# Patient Record
Sex: FEMALE | URBAN - METROPOLITAN AREA
[De-identification: names, ages, dates, MRNs, and addresses within clinical notes are randomized per-mention and may not be internally consistent; named-entity substitution may affect disease eponyms.]

---

## 2022-02-18 ENCOUNTER — OFFICE VISIT (OUTPATIENT)
Dept: URBAN - METROPOLITAN AREA CLINIC 43 | Facility: CLINIC | Age: 67
End: 2022-02-18
Payer: MEDICARE

## 2022-02-18 DIAGNOSIS — H04.123 DRY EYE SYNDROME OF BILATERAL LACRIMAL GLANDS: Primary | ICD-10-CM

## 2022-02-18 DIAGNOSIS — H11.32 SUBCONJUNCTIVAL HEMORRHAGE OF LEFT EYE: ICD-10-CM

## 2022-02-18 PROCEDURE — 99203 OFFICE O/P NEW LOW 30 MIN: CPT | Performed by: OPTOMETRIST

## 2022-02-18 ASSESSMENT — INTRAOCULAR PRESSURE
OS: 20
OD: 18

## 2022-03-08 ENCOUNTER — OFFICE VISIT (OUTPATIENT)
Dept: URBAN - METROPOLITAN AREA CLINIC 43 | Facility: CLINIC | Age: 67
End: 2022-03-08
Payer: MEDICARE

## 2022-03-08 DIAGNOSIS — H25.13 AGE-RELATED NUCLEAR CATARACT, BILATERAL: Primary | ICD-10-CM

## 2022-03-08 DIAGNOSIS — H52.4 PRESBYOPIA: ICD-10-CM

## 2022-03-08 DIAGNOSIS — H16.223 KERATOCONJUNCT SICCA, NOT SPECIFIED AS SJOGREN'S, BILATERAL: ICD-10-CM

## 2022-03-08 PROCEDURE — 99214 OFFICE O/P EST MOD 30 MIN: CPT | Performed by: OPTOMETRIST

## 2022-03-08 ASSESSMENT — VISUAL ACUITY
OD: 20/20
OS: 20/20

## 2022-03-08 ASSESSMENT — INTRAOCULAR PRESSURE
OS: 15
OD: 15

## 2022-03-08 NOTE — IMPRESSION/PLAN
Impression: Keratoconjunct sicca, not specified as Sjogren's, bilateral: P01.871. Plan: Pt failed AT.  Longtime uses of Restasis  PA for restasis will be submitted

## 2023-03-21 ENCOUNTER — OFFICE VISIT (OUTPATIENT)
Dept: URBAN - METROPOLITAN AREA CLINIC 43 | Facility: CLINIC | Age: 68
End: 2023-03-21
Payer: MEDICARE

## 2023-03-21 DIAGNOSIS — H04.222 EPIPHORA DUE TO INSUFFICIENT DRAINAGE, LEFT EYE: ICD-10-CM

## 2023-03-21 DIAGNOSIS — H16.223 KERATOCONJUNCT SICCA, NOT SPECIFIED AS SJOGREN'S, BILATERAL: Primary | ICD-10-CM

## 2023-03-21 DIAGNOSIS — H25.13 AGE-RELATED NUCLEAR CATARACT, BILATERAL: ICD-10-CM

## 2023-03-21 PROCEDURE — 99214 OFFICE O/P EST MOD 30 MIN: CPT | Performed by: OPTOMETRIST

## 2023-03-21 RX ORDER — CYCLOSPORINE/CHONDROITIN SULFATE PF 1 MG/ML
EMULSION OPHTHALMIC
Qty: 5.5 | Refills: 11 | Status: ACTIVE
Start: 2023-03-21

## 2023-03-21 ASSESSMENT — INTRAOCULAR PRESSURE
OS: 18
OD: 16

## 2023-03-21 ASSESSMENT — VISUAL ACUITY
OD: 20/20
OS: 20/20

## 2023-03-21 NOTE — IMPRESSION/PLAN
Impression: Age-related nuclear cataract, bilateral: H25.13. Plan:  Discussed cataracts with patient. Discussed treatment options. Surgical treatment is recommended. Surgical risks and benefits discussed. Patient elects surgical treatment. Recommend surgery OU, OD first. standard lens, ORA, Aim OD: plano. Aim OS: plano.

## 2023-03-21 NOTE — IMPRESSION/PLAN
Impression: Keratoconjunct sicca, not specified as Sjogren's, bilateral: B46.630. Plan: Recommend artificial tears at least 4 times a day and gel drop or tear ointment at bedtime. Restasis too costly. Will switch to cyclosporine in [facility].

## 2023-06-09 ENCOUNTER — OFFICE VISIT (OUTPATIENT)
Dept: URBAN - METROPOLITAN AREA CLINIC 43 | Facility: CLINIC | Age: 68
End: 2023-06-09
Payer: MEDICARE

## 2023-06-09 DIAGNOSIS — H11.32 SUBCONJUNCTIVAL HEMORRHAGE OF LEFT EYE: ICD-10-CM

## 2023-06-09 DIAGNOSIS — H16.223 KERATOCONJUNCT SICCA, NOT SPECIFIED AS SJOGREN'S, BILATERAL: ICD-10-CM

## 2023-06-09 DIAGNOSIS — H25.13 AGE-RELATED NUCLEAR CATARACT, BILATERAL: Primary | ICD-10-CM

## 2023-06-09 PROCEDURE — 99214 OFFICE O/P EST MOD 30 MIN: CPT | Performed by: OPTOMETRIST

## 2023-06-09 ASSESSMENT — VISUAL ACUITY
OD: 20/20
OS: 20/20

## 2023-06-09 ASSESSMENT — KERATOMETRY
OS: 44.88
OD: 44.63

## 2023-06-09 ASSESSMENT — INTRAOCULAR PRESSURE
OD: 15
OS: 16

## 2023-06-09 NOTE — IMPRESSION/PLAN
Impression: Subconjunctival hemorrhage of left eye: H11.32. Plan: DWP self limiting condition. Use ATs.

## 2023-06-09 NOTE — IMPRESSION/PLAN
Impression: Keratoconjunct sicca, not specified as Sjogren's, bilateral: P51.860. Plan: Recommend artificial tears at least 4 times a day and gel drop or tear ointment at bedtime. Restasis too costly. Will switch to cyclosporine in [facility].

## 2023-08-14 ENCOUNTER — OFFICE VISIT (OUTPATIENT)
Dept: URBAN - METROPOLITAN AREA CLINIC 44 | Facility: CLINIC | Age: 68
End: 2023-08-14
Payer: MEDICARE

## 2023-08-14 DIAGNOSIS — H25.13 AGE-RELATED NUCLEAR CATARACT, BILATERAL: Primary | ICD-10-CM

## 2023-08-14 ASSESSMENT — PACHYMETRY
OD: 3.29
OS: 22.28
OS: 3.23
OD: 22.45

## 2023-09-13 ENCOUNTER — POST-OPERATIVE VISIT (OUTPATIENT)
Dept: URBAN - METROPOLITAN AREA CLINIC 43 | Facility: CLINIC | Age: 68
End: 2023-09-13

## 2023-09-13 DIAGNOSIS — Z48.810 ENCOUNTER FOR SURGICAL AFTERCARE FOLLOWING SURGERY ON A SENSE ORGAN: Primary | ICD-10-CM

## 2023-09-13 PROCEDURE — 99024 POSTOP FOLLOW-UP VISIT: CPT | Performed by: OPTOMETRIST

## 2023-09-13 ASSESSMENT — INTRAOCULAR PRESSURE
OS: 14
OD: 15

## 2023-09-20 ENCOUNTER — POST-OPERATIVE VISIT (OUTPATIENT)
Dept: URBAN - METROPOLITAN AREA CLINIC 43 | Facility: CLINIC | Age: 68
End: 2023-09-20

## 2023-09-20 DIAGNOSIS — Z48.810 ENCOUNTER FOR SURGICAL AFTERCARE FOLLOWING SURGERY ON A SENSE ORGAN: Primary | ICD-10-CM

## 2023-09-20 PROCEDURE — 99024 POSTOP FOLLOW-UP VISIT: CPT | Performed by: OPTOMETRIST

## 2023-09-20 ASSESSMENT — INTRAOCULAR PRESSURE
OD: 15
OS: 18

## 2023-09-27 ENCOUNTER — POST-OPERATIVE VISIT (OUTPATIENT)
Dept: URBAN - METROPOLITAN AREA CLINIC 43 | Facility: CLINIC | Age: 68
End: 2023-09-27

## 2023-09-27 DIAGNOSIS — Z96.1 PRESENCE OF INTRAOCULAR LENS: Primary | ICD-10-CM

## 2023-09-27 PROCEDURE — 99024 POSTOP FOLLOW-UP VISIT: CPT | Performed by: OPTOMETRIST

## 2023-09-27 ASSESSMENT — INTRAOCULAR PRESSURE: OS: 14

## 2023-10-31 ENCOUNTER — OFFICE VISIT (OUTPATIENT)
Dept: URBAN - METROPOLITAN AREA CLINIC 43 | Facility: CLINIC | Age: 68
End: 2023-10-31

## 2023-10-31 DIAGNOSIS — Z96.1 PRESENCE OF INTRAOCULAR LENS: ICD-10-CM

## 2023-10-31 DIAGNOSIS — H26.492 OTHER SECONDARY CATARACT, LEFT EYE: Primary | ICD-10-CM

## 2023-10-31 PROCEDURE — 99024 POSTOP FOLLOW-UP VISIT: CPT | Performed by: OPHTHALMOLOGY

## 2023-11-07 ENCOUNTER — SURGERY (OUTPATIENT)
Dept: URBAN - METROPOLITAN AREA SURGERY 15 | Facility: SURGERY | Age: 68
End: 2023-11-07
Payer: MEDICARE

## 2023-11-07 PROCEDURE — 66821 AFTER CATARACT LASER SURGERY: CPT | Performed by: OPHTHALMOLOGY

## 2024-01-11 ENCOUNTER — POST-OPERATIVE VISIT (OUTPATIENT)
Dept: URBAN - METROPOLITAN AREA CLINIC 43 | Facility: CLINIC | Age: 69
End: 2024-01-11

## 2024-01-11 DIAGNOSIS — Z96.1 PRESENCE OF INTRAOCULAR LENS: Primary | ICD-10-CM

## 2024-01-11 PROCEDURE — 99024 POSTOP FOLLOW-UP VISIT: CPT | Performed by: OPTOMETRIST

## 2024-01-11 ASSESSMENT — INTRAOCULAR PRESSURE
OS: 16
OD: 14

## 2024-01-11 ASSESSMENT — KERATOMETRY
OS: 45.13
OD: 44.38

## 2024-01-11 ASSESSMENT — VISUAL ACUITY
OD: 20/20
OS: 20/20

## 2024-01-26 ENCOUNTER — OFFICE VISIT (OUTPATIENT)
Dept: URBAN - METROPOLITAN AREA CLINIC 10 | Facility: CLINIC | Age: 69
End: 2024-01-26
Payer: MEDICARE

## 2024-01-26 DIAGNOSIS — H43.313 VITREOUS MEMBRANES AND STRANDS, BILATERAL: ICD-10-CM

## 2024-01-26 DIAGNOSIS — R51.9 HEADACHE, UNSPECIFIED: ICD-10-CM

## 2024-01-26 DIAGNOSIS — H04.123 DRY EYE SYNDROME OF BILATERAL LACRIMAL GLANDS: Primary | ICD-10-CM

## 2024-01-26 PROCEDURE — 99213 OFFICE O/P EST LOW 20 MIN: CPT | Performed by: STUDENT IN AN ORGANIZED HEALTH CARE EDUCATION/TRAINING PROGRAM

## 2024-01-26 PROCEDURE — 92134 CPTRZ OPH DX IMG PST SGM RTA: CPT | Performed by: STUDENT IN AN ORGANIZED HEALTH CARE EDUCATION/TRAINING PROGRAM

## 2024-01-26 ASSESSMENT — INTRAOCULAR PRESSURE
OS: 13
OD: 13

## 2024-02-13 ENCOUNTER — OFFICE VISIT (OUTPATIENT)
Dept: URBAN - METROPOLITAN AREA CLINIC 34 | Facility: CLINIC | Age: 69
End: 2024-02-13
Payer: MEDICARE

## 2024-02-13 DIAGNOSIS — H04.422 CHRONIC LACRIMAL CANALICULITIS OF LEFT LACRIMAL PASSAGE: Primary | ICD-10-CM

## 2024-02-13 PROCEDURE — 99214 OFFICE O/P EST MOD 30 MIN: CPT | Performed by: STUDENT IN AN ORGANIZED HEALTH CARE EDUCATION/TRAINING PROGRAM

## 2024-02-13 PROCEDURE — 92285 EXTERNAL OCULAR PHOTOGRAPHY: CPT | Performed by: STUDENT IN AN ORGANIZED HEALTH CARE EDUCATION/TRAINING PROGRAM

## 2024-02-13 RX ORDER — NEOMYCIN SULFATE, POLYMYXIN B SULFATE AND DEXAMETHASONE 1; 3.5; 1 MG/ML; MG/ML; [USP'U]/ML
SUSPENSION OPHTHALMIC
Qty: 5 | Refills: 0 | Status: ACTIVE
Start: 2024-02-13

## 2024-04-30 ENCOUNTER — OFFICE VISIT (OUTPATIENT)
Dept: URBAN - METROPOLITAN AREA CLINIC 43 | Facility: CLINIC | Age: 69
End: 2024-04-30
Payer: MEDICARE

## 2024-04-30 DIAGNOSIS — H04.422 CHRONIC LACRIMAL CANALICULITIS OF LEFT LACRIMAL PASSAGE: Primary | ICD-10-CM

## 2024-04-30 PROCEDURE — 99213 OFFICE O/P EST LOW 20 MIN: CPT | Performed by: STUDENT IN AN ORGANIZED HEALTH CARE EDUCATION/TRAINING PROGRAM

## 2024-04-30 PROCEDURE — 92285 EXTERNAL OCULAR PHOTOGRAPHY: CPT | Performed by: STUDENT IN AN ORGANIZED HEALTH CARE EDUCATION/TRAINING PROGRAM

## 2024-04-30 PROCEDURE — 68530 CLEARANCE OF TEAR DUCT: CPT | Performed by: STUDENT IN AN ORGANIZED HEALTH CARE EDUCATION/TRAINING PROGRAM

## 2024-04-30 RX ORDER — NEOMYCIN SULFATE, POLYMYXIN B SULFATE AND DEXAMETHASONE 1; 3.5; 1 MG/ML; MG/ML; [USP'U]/ML
SUSPENSION OPHTHALMIC
Qty: 3.5 | Refills: 0 | Status: ACTIVE
Start: 2024-04-30

## 2024-07-11 ENCOUNTER — OFFICE VISIT (OUTPATIENT)
Dept: URBAN - METROPOLITAN AREA CLINIC 43 | Facility: CLINIC | Age: 69
End: 2024-07-11
Payer: MEDICARE

## 2024-07-11 DIAGNOSIS — Z83.511 FAMILY HISTORY OF GLAUCOMA: ICD-10-CM

## 2024-07-11 DIAGNOSIS — Z96.1 PRESENCE OF INTRAOCULAR LENS: ICD-10-CM

## 2024-07-11 DIAGNOSIS — H04.123 DRY EYE SYNDROME OF BILATERAL LACRIMAL GLANDS: Primary | ICD-10-CM

## 2024-07-11 PROCEDURE — 99214 OFFICE O/P EST MOD 30 MIN: CPT | Performed by: OPTOMETRIST

## 2024-07-11 RX ORDER — FLUOROMETHOLONE 1 MG/ML
0.1 % SOLUTION/ DROPS OPHTHALMIC
Qty: 5 | Refills: 1 | Status: ACTIVE
Start: 2024-07-11

## 2024-07-11 ASSESSMENT — INTRAOCULAR PRESSURE
OS: 19
OD: 19

## 2024-07-11 ASSESSMENT — VISUAL ACUITY
OD: 20/20
OS: 20/20

## 2024-07-11 ASSESSMENT — KERATOMETRY
OD: 44.38
OS: 45.13

## 2024-07-24 ENCOUNTER — OFFICE VISIT (OUTPATIENT)
Dept: URBAN - METROPOLITAN AREA CLINIC 43 | Facility: CLINIC | Age: 69
End: 2024-07-24
Payer: MEDICARE

## 2024-07-24 DIAGNOSIS — H04.422 CHRONIC LACRIMAL CANALICULITIS OF LEFT LACRIMAL PASSAGE: Primary | ICD-10-CM

## 2024-07-24 DIAGNOSIS — H57.12 OCULAR PAIN, LEFT EYE: ICD-10-CM

## 2024-07-24 PROCEDURE — 99214 OFFICE O/P EST MOD 30 MIN: CPT | Performed by: STUDENT IN AN ORGANIZED HEALTH CARE EDUCATION/TRAINING PROGRAM

## 2024-07-24 PROCEDURE — 92285 EXTERNAL OCULAR PHOTOGRAPHY: CPT | Performed by: STUDENT IN AN ORGANIZED HEALTH CARE EDUCATION/TRAINING PROGRAM

## 2024-09-10 ENCOUNTER — OFFICE VISIT (OUTPATIENT)
Dept: URBAN - METROPOLITAN AREA CLINIC 43 | Facility: CLINIC | Age: 69
End: 2024-09-10
Payer: COMMERCIAL

## 2024-09-10 DIAGNOSIS — H43.812 VITREOUS DEGENERATION, LEFT EYE: ICD-10-CM

## 2024-09-10 DIAGNOSIS — H04.123 DRY EYE SYNDROME OF BILATERAL LACRIMAL GLANDS: Primary | ICD-10-CM

## 2024-09-10 PROCEDURE — 99214 OFFICE O/P EST MOD 30 MIN: CPT | Performed by: OPTOMETRIST

## 2024-09-10 RX ORDER — CYCLOSPORINE/CHONDROITIN SULFATE PF 1 MG/ML
EMULSION OPHTHALMIC
Qty: 5.5 | Refills: 11 | Status: ACTIVE
Start: 2024-09-10

## 2024-09-10 ASSESSMENT — INTRAOCULAR PRESSURE
OD: 13
OS: 13

## 2024-10-02 ENCOUNTER — OFFICE VISIT (OUTPATIENT)
Dept: URBAN - METROPOLITAN AREA CLINIC 10 | Facility: CLINIC | Age: 69
End: 2024-10-02
Payer: COMMERCIAL

## 2024-10-02 DIAGNOSIS — H04.123 DRY EYE SYNDROME OF BILATERAL LACRIMAL GLANDS: ICD-10-CM

## 2024-10-02 DIAGNOSIS — H43.813 VITREOUS DEGENERATION, BILATERAL: Primary | ICD-10-CM

## 2024-10-02 DIAGNOSIS — H26.491 OTHER SECONDARY CATARACT, RIGHT EYE: ICD-10-CM

## 2024-10-02 PROCEDURE — 99214 OFFICE O/P EST MOD 30 MIN: CPT | Performed by: OPHTHALMOLOGY

## 2024-10-02 ASSESSMENT — INTRAOCULAR PRESSURE
OD: 15
OS: 21

## 2024-10-16 ENCOUNTER — OFFICE VISIT (OUTPATIENT)
Dept: URBAN - METROPOLITAN AREA CLINIC 10 | Facility: CLINIC | Age: 69
End: 2024-10-16
Payer: COMMERCIAL

## 2024-10-16 DIAGNOSIS — H26.491 OTHER SECONDARY CATARACT, RIGHT EYE: ICD-10-CM

## 2024-10-16 DIAGNOSIS — H43.813 VITREOUS DEGENERATION, BILATERAL: ICD-10-CM

## 2024-10-16 DIAGNOSIS — H52.4 PRESBYOPIA: Primary | ICD-10-CM

## 2024-10-16 PROCEDURE — 92014 COMPRE OPH EXAM EST PT 1/>: CPT | Performed by: OPTOMETRIST

## 2024-10-16 ASSESSMENT — VISUAL ACUITY
OD: 20/20
OS: 20/25

## 2024-10-16 ASSESSMENT — INTRAOCULAR PRESSURE
OS: 16
OD: 15

## 2025-01-07 ENCOUNTER — OFFICE VISIT (OUTPATIENT)
Dept: URBAN - METROPOLITAN AREA CLINIC 43 | Facility: CLINIC | Age: 70
End: 2025-01-07
Payer: COMMERCIAL

## 2025-01-07 DIAGNOSIS — H16.143 PUNCTATE KERATITIS, BILATERAL: Primary | ICD-10-CM

## 2025-01-07 PROCEDURE — 99214 OFFICE O/P EST MOD 30 MIN: CPT | Performed by: OPTOMETRIST

## 2025-01-22 ENCOUNTER — OFFICE VISIT (OUTPATIENT)
Dept: URBAN - METROPOLITAN AREA CLINIC 43 | Facility: CLINIC | Age: 70
End: 2025-01-22
Payer: COMMERCIAL

## 2025-01-22 DIAGNOSIS — H16.143 PUNCTATE KERATITIS, BILATERAL: Primary | ICD-10-CM

## 2025-01-22 PROCEDURE — 65778 COVER EYE W/MEMBRANE: CPT | Performed by: OPTOMETRIST

## 2025-01-24 ENCOUNTER — OFFICE VISIT (OUTPATIENT)
Dept: URBAN - METROPOLITAN AREA CLINIC 43 | Facility: CLINIC | Age: 70
End: 2025-01-24
Payer: COMMERCIAL

## 2025-01-24 PROCEDURE — 99213 OFFICE O/P EST LOW 20 MIN: CPT | Performed by: OPTOMETRIST

## 2025-02-17 ENCOUNTER — OFFICE VISIT (OUTPATIENT)
Dept: URBAN - METROPOLITAN AREA CLINIC 43 | Facility: CLINIC | Age: 70
End: 2025-02-17
Payer: COMMERCIAL

## 2025-02-17 DIAGNOSIS — H16.143 PUNCTATE KERATITIS, BILATERAL: Primary | ICD-10-CM

## 2025-02-17 PROCEDURE — 65778 COVER EYE W/MEMBRANE: CPT | Performed by: OPTOMETRIST

## 2025-02-19 ENCOUNTER — OFFICE VISIT (OUTPATIENT)
Dept: URBAN - METROPOLITAN AREA CLINIC 43 | Facility: CLINIC | Age: 70
End: 2025-02-19
Payer: COMMERCIAL

## 2025-02-19 PROCEDURE — 99213 OFFICE O/P EST LOW 20 MIN: CPT | Performed by: OPTOMETRIST

## 2025-03-18 ENCOUNTER — OFFICE VISIT (OUTPATIENT)
Dept: URBAN - METROPOLITAN AREA CLINIC 43 | Facility: CLINIC | Age: 70
End: 2025-03-18
Payer: COMMERCIAL

## 2025-03-18 DIAGNOSIS — H16.143 PUNCTATE KERATITIS, BILATERAL: Primary | ICD-10-CM

## 2025-03-18 PROCEDURE — 99213 OFFICE O/P EST LOW 20 MIN: CPT | Performed by: OPTOMETRIST

## 2025-03-18 ASSESSMENT — VISUAL ACUITY
OS: 20/20
OD: 20/20

## 2025-03-18 ASSESSMENT — INTRAOCULAR PRESSURE
OS: 16
OD: 14

## 2025-03-18 ASSESSMENT — KERATOMETRY
OS: 47.25
OD: 44.63

## 2025-07-18 ENCOUNTER — OFFICE VISIT (OUTPATIENT)
Dept: URBAN - METROPOLITAN AREA CLINIC 43 | Facility: CLINIC | Age: 70
End: 2025-07-18
Payer: COMMERCIAL

## 2025-07-18 DIAGNOSIS — H52.4 PRESBYOPIA: ICD-10-CM

## 2025-07-18 DIAGNOSIS — Z96.1 PRESENCE OF INTRAOCULAR LENS: ICD-10-CM

## 2025-07-18 DIAGNOSIS — H16.223 KERATOCONJUNCTIVITIS SICCA, BILATERAL: Primary | ICD-10-CM

## 2025-07-18 PROCEDURE — 99214 OFFICE O/P EST MOD 30 MIN: CPT | Performed by: OPTOMETRIST

## 2025-07-18 RX ORDER — CYCLOSPORINE OPHTHALMIC SOLUTION 1 MG/ML
0.1 % SOLUTION/ DROPS OPHTHALMIC
Qty: 2.5 | Refills: 11 | Status: ACTIVE
Start: 2025-07-18

## 2025-07-18 ASSESSMENT — INTRAOCULAR PRESSURE
OD: 14
OS: 14

## 2025-07-18 ASSESSMENT — VISUAL ACUITY
OD: 20/20
OS: 20/20